# Patient Record
(demographics unavailable — no encounter records)

---

## 2024-12-03 NOTE — PHYSICAL EXAM
[Alert] : alert [Normal Sclera/Conjunctiva] : normal sclera/conjunctiva [Normal Hearing] : hearing was normal [No Respiratory Distress] : no respiratory distress [No Accessory Muscle Use] : no accessory muscle use [Clear to Auscultation] : lungs were clear to auscultation bilaterally [Normal S1, S2] : normal S1 and S2 [No Stigmata of Cushings Syndrome] : no stigmata of Cushings Syndrome [Normal Gait] : normal gait [Oriented x3] : oriented to person, place, and time [de-identified] : Enlarged thyroid

## 2024-12-03 NOTE — HISTORY OF PRESENT ILLNESS
[FreeTextEntry1] : OB: Dr. Sykes  LMP:  2024 SUZANNE: 4/10/2025 Weeks: 22 weeks G 4 P2   deliveries   Sonograms 20 week anatomy scan. measuring normal   Plan to Breastfeed: Yes  Abnormal TSH  Current Medications: none- can not tolerate PNV  Thyroid U/S: 10/7/2024 LLP 1.1 x 0.3 x 0.7 cm complex nodule  Current tft's: Labsa 2024 TSH  0.67, FT4 0.8, FT3 2.6   Denies palpitations, tremors, diarrhea, and weight loss